# Patient Record
Sex: FEMALE | Race: AMERICAN INDIAN OR ALASKA NATIVE
[De-identification: names, ages, dates, MRNs, and addresses within clinical notes are randomized per-mention and may not be internally consistent; named-entity substitution may affect disease eponyms.]

---

## 2019-03-17 ENCOUNTER — HOSPITAL ENCOUNTER (EMERGENCY)
Dept: HOSPITAL 43 - DL.ED | Age: 29
LOS: 1 days | Discharge: HOME | End: 2019-03-18
Payer: COMMERCIAL
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
## 2019-03-17 DIAGNOSIS — S80.02XA: Primary | ICD-10-CM

## 2019-03-17 DIAGNOSIS — Z88.1: ICD-10-CM

## 2019-03-17 PROCEDURE — 73562 X-RAY EXAM OF KNEE 3: CPT

## 2019-03-17 PROCEDURE — 99283 EMERGENCY DEPT VISIT LOW MDM: CPT

## 2019-03-17 NOTE — EDM.PDOC
ED HPI GENERAL MEDICAL PROBLEM





- General


Chief Complaint: Lower Extremity Injury/Pain


Stated Complaint: SLIPPED AND FELL,HURT LEFT KNEE


Time Seen by Provider: 03/17/19 23:50


Source of Information: Reports: Patient


History Limitations: Reports: No Limitations





- History of Present Illness


INITIAL COMMENTS - FREE TEXT/NARRATIVE: 





fell onto left knee PTA


  ** Left Knee


Pain Score (Numeric/FACES): 2





- Related Data


 Allergies











Allergy/AdvReac Type Severity Reaction Status Date / Time


 


cephalexin monohydrate Allergy Unknown Rash Verified 03/17/19 23:53





[From Keflex]     


 


Penicillins Allergy Unknown unknown Verified 03/17/19 23:53











Home Meds: 


 Home Meds





. [No Known Home Meds]  09/04/16 [History]











Past Medical History





- Past Health History


Medical/Surgical History: Denies Medical/Surgical History


Other OB/GYN History: Gravdia 5 para 3


Other Dermatologic History: skin graft





- Past Surgical History


HEENT Surgical History: Reports: Adenoidectomy, Tonsillectomy


GI Surgical History: Reports: Appendectomy





Social & Family History





- Family History


Endocrine/Metabolic: Reports: Diabetes, type II





- Living Situation & Occupation


Living situation: Reports: with Family





Review of Systems





- Review of Systems


Review Of Systems: ROS reveals no pertinent complaints other than HPI.





ED EXAM, GENERAL





- Physical Exam


Exam: See Below


Exam Limited By: No Limitations


General Appearance: Alert, WD/WN, Mild Distress, Other (pain)


Ears: Hearing Grossly Normal


Throat/Mouth: Normal Voice, No Airway Compromise


Head: Atraumatic


Neck: Non-Tender, Full Range of Motion


Respiratory/Chest: No Respiratory Distress


Cardiovascular: Regular Rate, Rhythm


GI/Abdominal: Soft, Non-Tender


Extremities: Other (left knee mild swelling, no gross D/D, NV wnl, gait limited 

to pain)


Neurological: Alert, Oriented, Normal Cognition, No Motor/Sensory Deficits


Psychiatric: Tearful


Skin Exam: Warm, Dry, Normal Color


Lymphatic: No Adenopathy





Course





- Vital Signs


Last Recorded V/S: 


 Last Vital Signs











Temp  35.5 C   03/17/19 23:44


 


Pulse  83   03/17/19 23:44


 


Resp  17   03/17/19 23:44


 


BP  144/74 H  03/17/19 23:44


 


Pulse Ox  98   03/17/19 23:44














- Orders/Labs/Meds


Meds: 


Medications














Discontinued Medications














Generic Name Dose Route Start Last Admin





  Trade Name Freq  PRN Reason Stop Dose Admin


 


Hydrocodone Bitart/Acetaminophen  1 tab  03/17/19 23:50  03/17/19 23:54





  Norco 325-10 Mg  PO  03/17/19 23:51  1 tab





  ONETIME ONE   Administration





     





     





     





     














- Re-Assessments/Exams


Free Text/Narrative Re-Assessment/Exam: 





03/18/19 00:55


results discussed with pt





Departure





- Departure


Time of Disposition: 00:56


Disposition: Home, Self-Care 01


Condition: Good


Clinical Impression: 


 Contusion of knee








- Discharge Information


Instructions:  Contusion, Easy-to-Read


Forms:  ED Department Discharge


Additional Instructions: 


1) elevate knee as much as possible next 48 hours


2) ice for swelling


3) wear ACE for comfort


4) take tylenol or motrin for pain


5) see clinic for possible MRI SCAN if not totally better by Tuesday

## 2019-03-18 VITALS — SYSTOLIC BLOOD PRESSURE: 123 MMHG | DIASTOLIC BLOOD PRESSURE: 86 MMHG
